# Patient Record
Sex: FEMALE | URBAN - METROPOLITAN AREA
[De-identification: names, ages, dates, MRNs, and addresses within clinical notes are randomized per-mention and may not be internally consistent; named-entity substitution may affect disease eponyms.]

---

## 2024-11-01 ENCOUNTER — APPOINTMENT (OUTPATIENT)
Dept: URBAN - METROPOLITAN AREA CLINIC 298 | Age: 29
Setting detail: DERMATOLOGY
End: 2024-11-01

## 2024-11-01 DIAGNOSIS — Z41.9 ENCOUNTER FOR PROCEDURE FOR PURPOSES OTHER THAN REMEDYING HEALTH STATE, UNSPECIFIED: ICD-10-CM

## 2024-11-01 PROCEDURE — OTHER CHEMICAL PEEL: OTHER

## 2024-11-01 ASSESSMENT — LOCATION DETAILED DESCRIPTION DERM
LOCATION DETAILED: RIGHT CENTRAL MALAR CHEEK
LOCATION DETAILED: LEFT INFERIOR CENTRAL MALAR CHEEK
LOCATION DETAILED: INFERIOR MID FOREHEAD
LOCATION DETAILED: LEFT CHIN

## 2024-11-01 ASSESSMENT — LOCATION SIMPLE DESCRIPTION DERM
LOCATION SIMPLE: RIGHT CHEEK
LOCATION SIMPLE: INFERIOR FOREHEAD
LOCATION SIMPLE: CHIN
LOCATION SIMPLE: LEFT CHEEK

## 2024-11-01 ASSESSMENT — LOCATION ZONE DERM: LOCATION ZONE: FACE

## 2024-11-01 NOTE — PROCEDURE: CHEMICAL PEEL
Treatment Number: 1
Price (Use Numbers Only, No Special Characters Or $): 200
Detail Level: Zone
Prep: The treated area was degreased with pre-peel cleanser, and vaseline was applied for protection of mucous membranes.
Post Peel Care: After the procedure, a post-peel cream was applied to the treated areas. Sun protection and post-care instructions were reviewed with the patient.
Comments: Bio re peel for acne. Wants to do glytone next apt. On [employer]. Instructed pt on pre and post care.
Consent: Prior to the procedure, written consent was obtained and risks were reviewed, including but not limited to: redness, peeling, blistering, pigmentary change, scarring, infection, and pain.
Post-Care Instructions: I reviewed with the patient in detail post-care instructions. Patient should avoid sun exposure and wear sun protection.
Chemical Peel: TCA 33%